# Patient Record
(demographics unavailable — no encounter records)

---

## 2025-02-26 NOTE — CONSULT LETTER
[Dear  ___] : Dear  [unfilled], [Consult Letter:] : I had the pleasure of evaluating your patient, [unfilled]. [Please see my note below.] : Please see my note below. [Consult Closing:] : Thank you very much for allowing me to participate in the care of this patient.  If you have any questions, please do not hesitate to contact me. [Sincerely,] : Sincerely, [FreeTextEntry2] : Lorelei Lambert MD [FreeTextEntry3] : Kirt Murrell MD FAAP FACS Director, [facility] Division of Pediatric General, Thoracic and Endoscopic Surgery [facility]

## 2025-02-26 NOTE — ADDENDUM
[FreeTextEntry1] : Documented by Joe Trevino acting as a scribe for Dr. Murrell on 02/26/2025.   All medical record entries made by the Scribe were at my, Dr. Murrell, direction and personally dictated by me on 02/26/2025. I have reviewed the chart and agree that the record accurately reflects my personal performances of the history, physical exam, assessment and plan. I have also personally directed, reviewed, and agree with the instructions.

## 2025-02-26 NOTE — HISTORY OF PRESENT ILLNESS
[FreeTextEntry1] : August is a 16 month old female here today to be evaluated for concerns of anal fissures. Mom notes August passed meconium within the first day of life and denies any issues with stooling until August reached 11 months of age. At that time, she began to strain during her bowel movements, which mom notes resulted in her developing fissures with associated bleeding and discomfort. She has been treated with a half cap of Otilia-LAX in the past, but mom recently stopped, as she noticed August's stool becoming too watery. She has been applying flaxseed oil to the region, which she feels has improved the lesions. Otherwise, August is having a bowel movement daily. She is eating well without emesis. No fevers reported.

## 2025-02-26 NOTE — PHYSICAL EXAM
[NL] : grossly intact [Normal external genitalia] : normal external genitalia [Patent] : patent [Anal fissure] : anal fissure [Erythema surrounding anus] : no erythema surrounding anus [Resistance with insertion of dilator] : no resistance with insertion of dilator [Anus in sphincter complex] : anus in sphincter complex [TextBox_67] : normal perineal body [TextBox_59] : Anus is nicely surrounded by muscle, anterior midline fissure appreciated, no skin tags, normal appearing perineal body

## 2025-02-26 NOTE — ASSESSMENT
[FreeTextEntry1] : August is a 16 month old female with a history of constipation presenting today with an anterior midline fissure. She is otherwise healthy and doing well and has been dealing with constipation and intermittent bleeding from the fissure since 11 months of age. I counseled the parents and reassured them that the fissure appreciated on exam today does not warrant any urgent concerns or acute surgical intervention. We discussed the natural history of anal fissures and their association with constipation in children. I then reviewed the goal of managing August's constipation moving forward by establishing an efficient bowel management regimen to allow for resolution of the fissure and to avoid further complications in the future. Thus, I recommended the family remains compliant with a non-constipating diet to promote good stool health and we also reviewed stooling techniques for the family to try at home. I also provided them with the contact information required to schedule an appointment with the GI team for further management of August's constipation. After our discussion, the family verbalized their understanding and we agreed to follow up in one month to reexamine and assess August's progress. They have my information and know to contact me sooner with any questions or concerns.

## 2025-02-26 NOTE — REASON FOR VISIT
[Initial - Scheduled] : an initial, scheduled visit with concerns of [Patient] : patient [Parents] : parents

## 2025-04-02 NOTE — CONSULT LETTER
[Dear  ___] : Dear  [unfilled], [Consult Letter:] : I had the pleasure of evaluating your patient, [unfilled]. [Please see my note below.] : Please see my note below. [Consult Closing:] : Thank you very much for allowing me to participate in the care of this patient.  If you have any questions, please do not hesitate to contact me. [Sincerely,] : Sincerely, [FreeTextEntry3] : Kirt Murrell MD FAAP FACS Director, [facility] Division of Pediatric General, Thoracic and Endoscopic Surgery [facility]

## 2025-04-02 NOTE — HISTORY OF PRESENT ILLNESS
[FreeTextEntry1] : August is a 17mo female with a history of anal fissures. She was previously managed on MiraLAX; however, it was discontinued when the stools became too loose. Mother of child began treating the area with flaxseed oil and noticed improvement. It was recommended that she proceed with a workup from GI to establish a successful bowel regimen.